# Patient Record
Sex: MALE | Race: WHITE | NOT HISPANIC OR LATINO | Employment: FULL TIME | URBAN - METROPOLITAN AREA
[De-identification: names, ages, dates, MRNs, and addresses within clinical notes are randomized per-mention and may not be internally consistent; named-entity substitution may affect disease eponyms.]

---

## 2023-07-21 ENCOUNTER — LAB (OUTPATIENT)
Dept: LAB | Facility: HOSPITAL | Age: 33
End: 2023-07-21
Payer: COMMERCIAL

## 2023-07-21 DIAGNOSIS — Z13.6 SCREENING FOR ISCHEMIC HEART DISEASE: ICD-10-CM

## 2023-07-21 DIAGNOSIS — Z01.810 PRE-OPERATIVE CARDIOVASCULAR EXAMINATION: ICD-10-CM

## 2023-07-21 PROBLEM — Q21.3 TETRALOGY OF FALLOT: Status: ACTIVE | Noted: 2023-07-21

## 2023-07-21 PROBLEM — R06.09 DYSPNEA ON EXERTION: Status: ACTIVE | Noted: 2023-07-21

## 2023-07-21 LAB
ANION GAP SERPL CALCULATED.3IONS-SCNC: 11 MMOL/L (ref 5–15)
BASOPHILS # BLD AUTO: 0.07 10*3/MM3 (ref 0–0.2)
BASOPHILS NFR BLD AUTO: 1.2 % (ref 0–1.5)
BUN SERPL-MCNC: 17 MG/DL (ref 6–20)
BUN/CREAT SERPL: 21.5 (ref 7–25)
CALCIUM SPEC-SCNC: 9.7 MG/DL (ref 8.6–10.5)
CHLORIDE SERPL-SCNC: 105 MMOL/L (ref 98–107)
CO2 SERPL-SCNC: 24 MMOL/L (ref 22–29)
CREAT SERPL-MCNC: 0.79 MG/DL (ref 0.76–1.27)
DEPRECATED RDW RBC AUTO: 38.9 FL (ref 37–54)
EGFRCR SERPLBLD CKD-EPI 2021: 121 ML/MIN/1.73
EOSINOPHIL # BLD AUTO: 0.38 10*3/MM3 (ref 0–0.4)
EOSINOPHIL NFR BLD AUTO: 6.4 % (ref 0.3–6.2)
ERYTHROCYTE [DISTWIDTH] IN BLOOD BY AUTOMATED COUNT: 12.2 % (ref 12.3–15.4)
GLUCOSE SERPL-MCNC: 91 MG/DL (ref 65–99)
HCT VFR BLD AUTO: 43.8 % (ref 37.5–51)
HGB BLD-MCNC: 14.9 G/DL (ref 13–17.7)
IMM GRANULOCYTES # BLD AUTO: 0.02 10*3/MM3 (ref 0–0.05)
IMM GRANULOCYTES NFR BLD AUTO: 0.3 % (ref 0–0.5)
LYMPHOCYTES # BLD AUTO: 1.8 10*3/MM3 (ref 0.7–3.1)
LYMPHOCYTES NFR BLD AUTO: 30.1 % (ref 19.6–45.3)
MCH RBC QN AUTO: 30 PG (ref 26.6–33)
MCHC RBC AUTO-ENTMCNC: 34 G/DL (ref 31.5–35.7)
MCV RBC AUTO: 88.3 FL (ref 79–97)
MONOCYTES # BLD AUTO: 0.49 10*3/MM3 (ref 0.1–0.9)
MONOCYTES NFR BLD AUTO: 8.2 % (ref 5–12)
NEUTROPHILS NFR BLD AUTO: 3.22 10*3/MM3 (ref 1.7–7)
NEUTROPHILS NFR BLD AUTO: 53.8 % (ref 42.7–76)
NRBC BLD AUTO-RTO: 0 /100 WBC (ref 0–0.2)
PLATELET # BLD AUTO: 246 10*3/MM3 (ref 140–450)
PMV BLD AUTO: 9.8 FL (ref 6–12)
POTASSIUM SERPL-SCNC: 4.1 MMOL/L (ref 3.5–5.2)
RBC # BLD AUTO: 4.96 10*6/MM3 (ref 4.14–5.8)
SODIUM SERPL-SCNC: 140 MMOL/L (ref 136–145)
WBC NRBC COR # BLD: 5.98 10*3/MM3 (ref 3.4–10.8)

## 2023-07-21 PROCEDURE — 85025 COMPLETE CBC W/AUTO DIFF WBC: CPT

## 2023-07-21 PROCEDURE — 36415 COLL VENOUS BLD VENIPUNCTURE: CPT

## 2023-07-21 PROCEDURE — 80048 BASIC METABOLIC PNL TOTAL CA: CPT

## 2023-07-21 NOTE — H&P (VIEW-ONLY)
"      [facility]    Subjective:     Encounter Date:07/21/23      Patient ID: Wilfrid Almanzar is a 33 y.o. male.    Chief Complaint:   Chief Complaint   Patient presents with    RS      History of Present Illness    Mr. 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  He states otherwise he has felt well without any symptoms and is previously been healthy.  He denies any other cardiac testing over the last several years.  He has not followed with a congenital specialist.    He reports that over the last 6 months, he noticed a chronic cough, the cough was a nagging, dry cough, and he had some shortness of breath with it.  He states that occasionally he would have to \"gasp to trying to get deep breaths and\" and would happen sporadically without warning.  He also has intermittent episodes of palpitations, they do not seem to have any correlation regarding to the dyspnea spells.  For the dyspnea, he was evaluated by Dr. Johnson in pulmonary, he was evaluated by pulmonary on July 10 for ongoing dyspnea symptoms and is being checked out for sleep apnea.  He did have PFTs which showed moderately reduced diffusion capacity is not corrected for hemoglobin or alveolar volume which would suggest a vascular process such as pulm hypertension.    He underwent a CT chest at [facility] which showed enlarged RV and prior median sternotomy with mildly enlarged left pulmonary artery.    I reviewed his echocardiogram.  There is mild pulmonic stenosis with a max gradient of 23 mm, mean of 10.  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    Currently, he states that with starting on montelukast, his symptoms have improved.  He is near his baseline now, he does have some very mild dyspnea when he really exerts himself " "and can feel some exertional palpitations, and occasional palpitations at rest.    Operative history:  Tetralogy of Fallot  Cardiac surgery with Dr. Corona in [facility].    On January 28, 1993 he underwent resection of the subpulmonic muscular obstruction, pulmonary commissurotomy.  There is a dacryon patch closure of VSD, pericardial patch enlargement of the main PA, Hemashield dacryon patch enlargement of the right ventricular outflow tract.  There is a bicuspid stenotic pulmonary valve, where the commissures were incised and cut off the wall opening the valve to 10 mm in diameter at the time of his operation.      The following portions of the patient's history were reviewed and updated as appropriate: allergies, current medications, past family history, past medical history, past social history, past surgical history and problem list.    History reviewed. No pertinent past medical history.    History reviewed. No pertinent surgical history.        ECG 12 Lead    Date/Time: 7/21/2023 10:51 AM  Performed by: Chepe Loaiza MD  Authorized by: Chepe Loaiza MD   Previous ECG: no previous ECG available  Rhythm: sinus rhythm  Rate: normal  Conduction: right bundle branch block  ST Depression: V2 and V3  T inversion: V2, V3 and aVL  QRS axis: right and extreme  Other findings: non-specific ST-T wave changes    Clinical impression: abnormal EKG           Objective:     Vitals:    07/21/23 1032   BP: 120/72   BP Location: Left arm   Patient Position: Sitting   Cuff Size: Adult   Pulse: 71   SpO2: 97%   Weight: 75.6 kg (166 lb 9.6 oz)   Height: 170.2 cm (67\")         Constitutional:       Appearance: Healthy appearance. Not in distress.   Neck:      Vascular: JVD normal.   Pulmonary:      Effort: Pulmonary effort is normal.      Breath sounds: Normal breath sounds.   Cardiovascular:      PMI at left midclavicular line. Normal rate. Regular rhythm. Normal S2.       Murmurs: There is a grade 2/6 harsh " midsystolic murmur at the ULSB, radiating to the neck.   Pulses:     Intact distal pulses.   Edema:     Peripheral edema absent.   Skin:     General: Skin is warm and dry.   Neurological:      General: No focal deficit present.      Mental Status: Alert, oriented to person, place, and time and oriented to person, place and time.   Psychiatric:         Mood and Affect: Mood and affect normal.       Lab Review:       No results found for: BUN, CREATININE, K, ALT, AST      Performed        Assessment:          Diagnosis Plan   1. Tetralogy of Fallot  ECG 12 Lead    Case Request Cath Lab: Right Heart Cath      2. Dyspnea on exertion  Case Request Cath Lab: Right Heart Cath      3. Palpitations  Holter Monitor - 48 Hour             Plan:         Repaired tetralogy of Fallot: I reviewed his echocardiogram, I cannot tell if the level of the flow accelerations at the level of the valve, or if it is in the RVOT.  We will further check a right heart cath.  There are concerns for pulmonary hypertension, however I would be interested to see if his pulmonary pressures are actually elevated, given the RVSP elevation, and the peak pulmonic gradient of 23, his pulmonary arterial pressures are actually normal on the echo  Mild pulmonic stenosis with moderate to severe pulmonic regurgitation  If he does have a significantly stenotic RVOT versus pulmonic valve, will likely need to further evaluate with a cardiac CT, with possible referral to congenital heart disease specialist for secondary evaluation.  However, his main complaint is dyspnea, he only has mildly elevated gradients, I am not sure that that would be causing his symptoms.  It does appear that he has moderate to severe pulmonic regurgitation.  We will arrange for right heart cath to also look for any diastolic flow reversal in his PA  Dyspnea on exertion: He reports his symptoms are actually better, along with his cough, on the Singulair.  It may very well be that he just  had a component of allergies, and his cardiac/tetralogy status is actually asymptomatic.  Checking right heart cath per above, and investigating his pulmonic regurgitation.  Agree with sleep testing and further evaluation of other causes of dyspnea.  Palpitations: Sporadically, randomly.  No syncope or loss of consciousness just occasional prominent heartbeat  Check 48-hour Holter    Thank you for allowing me to participate in the care of Wilfrid Almanzar. Feel free to contact me directly with any further questions or concerns.    RTC 3 months for symptom recheck.  Arranging for right heart cath to delineate where his pressures are elevated, evaluate the degree and severity of his pulmonic regurgitation, possible cardiac CT versus referral to [facility] congenital heart disease if there are significant abnormalities noted on his right heart cath.  Arranging for Holter per above.    Chepe Loaiza MD  [facility]  07/21/23  10:41 EDT       Current Outpatient Medications:     albuterol sulfate  (90 Base) MCG/ACT inhaler, Inhale 2 puffs Every 4 (Four) Hours As Needed for Wheezing., Disp: , Rfl:     montelukast (SINGULAIR) 10 MG tablet, Take 1 tablet by mouth Every Night., Disp: , Rfl:          Return in about 3 months (around 10/21/2023).      Part of this note may be an electronic transcription/translation of spoken language to printed text using the Dragon Dictation System.

## 2023-07-25 ENCOUNTER — HOSPITAL ENCOUNTER (OUTPATIENT)
Facility: HOSPITAL | Age: 33
Setting detail: HOSPITAL OUTPATIENT SURGERY
Discharge: HOME | End: 2023-07-25
Attending: INTERNAL MEDICINE | Admitting: INTERNAL MEDICINE
Payer: COMMERCIAL

## 2023-07-25 VITALS
SYSTOLIC BLOOD PRESSURE: 105 MMHG | DIASTOLIC BLOOD PRESSURE: 73 MMHG | HEIGHT: 67 IN | RESPIRATION RATE: 16 BRPM | HEART RATE: 59 BPM | BODY MASS INDEX: 25.98 KG/M2 | TEMPERATURE: 97.4 F | WEIGHT: 165.5 LBS | OXYGEN SATURATION: 98 %

## 2023-07-25 DIAGNOSIS — R06.09 DYSPNEA ON EXERTION: ICD-10-CM

## 2023-07-25 DIAGNOSIS — Q21.3 TETRALOGY OF FALLOT: ICD-10-CM

## 2023-07-25 PROCEDURE — C1769 GUIDE WIRE: HCPCS | Performed by: INTERNAL MEDICINE

## 2023-07-25 PROCEDURE — 25010000002 MIDAZOLAM PER 1 MG: Performed by: INTERNAL MEDICINE

## 2023-07-25 PROCEDURE — 93569 NJX CTH SLCT P-ART ANGRP UNI: CPT | Performed by: INTERNAL MEDICINE

## 2023-07-25 PROCEDURE — 25510000001 IOPAMIDOL PER 1 ML: Performed by: INTERNAL MEDICINE

## 2023-07-25 PROCEDURE — 93451 RIGHT HEART CATH: CPT | Performed by: INTERNAL MEDICINE

## 2023-07-25 PROCEDURE — 25010000002 FENTANYL CITRATE (PF) 50 MCG/ML SOLUTION: Performed by: INTERNAL MEDICINE

## 2023-07-25 PROCEDURE — C1894 INTRO/SHEATH, NON-LASER: HCPCS | Performed by: INTERNAL MEDICINE

## 2023-07-25 RX ORDER — FENTANYL CITRATE 50 UG/ML
INJECTION, SOLUTION INTRAMUSCULAR; INTRAVENOUS
Status: DISCONTINUED | OUTPATIENT
Start: 2023-07-25 | End: 2023-07-25 | Stop reason: HOSPADM

## 2023-07-25 RX ORDER — CETIRIZINE HYDROCHLORIDE 10 MG/1
10 TABLET ORAL DAILY
COMMUNITY

## 2023-07-25 RX ORDER — SODIUM CHLORIDE 0.9 % (FLUSH) 0.9 %
10 SYRINGE (ML) INJECTION AS NEEDED
Status: DISCONTINUED | OUTPATIENT
Start: 2023-07-25 | End: 2023-07-25 | Stop reason: HOSPADM

## 2023-07-25 RX ORDER — SODIUM CHLORIDE 9 MG/ML
75 INJECTION, SOLUTION INTRAVENOUS CONTINUOUS
Status: DISCONTINUED | OUTPATIENT
Start: 2023-07-25 | End: 2023-07-25 | Stop reason: HOSPADM

## 2023-07-25 RX ORDER — LIDOCAINE HYDROCHLORIDE 20 MG/ML
INJECTION, SOLUTION INFILTRATION; PERINEURAL
Status: DISCONTINUED | OUTPATIENT
Start: 2023-07-25 | End: 2023-07-25 | Stop reason: HOSPADM

## 2023-07-25 RX ORDER — MIDAZOLAM HYDROCHLORIDE 1 MG/ML
INJECTION INTRAMUSCULAR; INTRAVENOUS
Status: DISCONTINUED | OUTPATIENT
Start: 2023-07-25 | End: 2023-07-25 | Stop reason: HOSPADM

## 2023-07-25 RX ADMIN — SODIUM CHLORIDE 75 ML/HR: 9 INJECTION, SOLUTION INTRAVENOUS at 09:06

## 2023-07-25 NOTE — Clinical Note
The the pulmonary artery was injected and visualized. Rate = 10 mL/sec. Total volume = 20 mL. At 450 PSI.

## 2023-07-25 NOTE — Clinical Note
Hemostasis started on the right brachial vein. Manual pressure applied to vessel. Manual pressure was held by Modesta REILLY. Manual pressure was held for 5 min. Hemostasis achieved successfully.

## 2023-07-25 NOTE — INTERVAL H&P NOTE
H&P reviewed. The patient was examined and there are no changes to the H&P.         vital signs/nurses notes

## 2023-07-26 NOTE — PROGRESS NOTES
I reviewed findings with patient over the phone.  There is no evidence of pulmonary hypertension, normal cardiac index.  There is a peak gradient of 10 mmHg across his pulmonic valve, and there appears to be at least moderate pulmonic regurgitation.  Given his dilated RV, pulmonary regurgitation in the setting of repaired tetralogy of Fallot and his most recent operation being at the age of 3, will refer to adult congenital cardiology for further evaluation.  I went over findings with patient he is amenable to further evaluation.  I discussed options of referral with adult congenital cardiology [facility] versus [facility], and he is amenable to proceeding with evaluation at .

## 2023-10-19 ENCOUNTER — TRANSCRIBE ORDERS (OUTPATIENT)
Dept: CARDIAC REHAB | Facility: HOSPITAL | Age: 33
End: 2023-10-19
Payer: COMMERCIAL

## 2023-10-19 DIAGNOSIS — Z95.4 S/P TRANSCATHETER REPLACEMENT OF PULMONARY VALVE: Primary | ICD-10-CM

## 2023-10-25 ENCOUNTER — OFFICE VISIT (OUTPATIENT)
Dept: CARDIAC REHAB | Facility: HOSPITAL | Age: 33
End: 2023-10-25
Payer: COMMERCIAL

## 2023-10-25 DIAGNOSIS — Z95.4 S/P TRANSCATHETER REPLACEMENT OF PULMONARY VALVE: Primary | ICD-10-CM

## 2023-10-25 PROCEDURE — 93798 PHYS/QHP OP CAR RHAB W/ECG: CPT

## 2023-10-27 ENCOUNTER — OFFICE VISIT (OUTPATIENT)
Dept: CARDIOLOGY | Facility: CLINIC | Age: 33
End: 2023-10-27
Payer: COMMERCIAL

## 2023-10-27 VITALS
BODY MASS INDEX: 27.09 KG/M2 | OXYGEN SATURATION: 97 % | SYSTOLIC BLOOD PRESSURE: 110 MMHG | HEIGHT: 67 IN | HEART RATE: 90 BPM | WEIGHT: 172.6 LBS | DIASTOLIC BLOOD PRESSURE: 72 MMHG

## 2023-10-27 DIAGNOSIS — R06.09 DYSPNEA ON EXERTION: ICD-10-CM

## 2023-10-27 DIAGNOSIS — Q21.3 TETRALOGY OF FALLOT: Primary | ICD-10-CM

## 2023-10-27 DIAGNOSIS — Z95.2 H/O PULMONIC VALVE REPLACEMENT: ICD-10-CM

## 2023-10-27 PROCEDURE — 99214 OFFICE O/P EST MOD 30 MIN: CPT | Performed by: STUDENT IN AN ORGANIZED HEALTH CARE EDUCATION/TRAINING PROGRAM

## 2023-10-27 NOTE — PROGRESS NOTES
[facility]    Subjective:     Encounter Date:10/27/23      Patient ID: Wilfrid Almanzar is a 33 y.o. male.    Chief Complaint:   Chief Complaint   Patient presents with    Tetralogy of Fallot   Follow-up post [place] valve  History of Present Illness    Mr. 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  He states otherwise he has felt well without any symptoms and is previously been healthy.  He denies any other cardiac testing over the last several years.  He has not followed with a congenital specialist.    He had worsening exertional symptoms and his CT chest obtained by his pulmonologist showed RV enlargement.  He subsequent underwent an echocardiogram which revealed moderate pulmonary stenosis and probably severe PI.  His RV was dilated.  With his dyspnea symptoms in the setting of pulmonary insufficiency, he was referred to congenital heart disease at [facility].  He subsequent underwent a [place] valve placement and tolerated the procedure well, per below.    He presents today for follow-up.  He reports compliance with his Eliquis and aspirin monotherapy.  He has not had any bleeding complications.  His breathing is much improved and he is glad he underwent the treatment.  He continues to follow with a dentist and will take dental prophylaxis before any procedures.  He has scheduled follow-up with [facility] next month    Operative history:  Tetralogy of Fallot  Cardiac surgery with Dr. Corona in [facility].    On January 28, 1993 he underwent resection of the subpulmonic muscular obstruction, pulmonary commissurotomy.  There is a dacryon patch closure of VSD, pericardial patch enlargement of the main PA, Hemashield dacryon patch enlargement of the right ventricular outflow tract.  There is a bicuspid stenotic pulmonary  "valve, where the commissures were incised and cut off the wall opening the valve to 10 mm in diameter at the time of his operation.    He underwent successful implantation of a [place] T PV 25 on October 10, 2023 with Dr. Andrew Leventhal at [facility].  - Successful decrease of gradients to proximal and 50 mmHg across the valve.  Aortic regurgitation has resolved.  - The procedure was technically difficult there were multiple valves that were placed and replaced, subsequently and there is a flail tricuspid leaflet with a increase in tricuspid regurgitation    The following portions of the patient's history were reviewed and updated as appropriate: allergies, current medications, past family history, past medical history, past social history, past surgical history and problem list.    Past Medical History:   Diagnosis Date    Tetralogy of Fallot        Past Surgical History:   Procedure Laterality Date    CARDIAC CATHETERIZATION N/A 7/25/2023    Procedure: Right Heart Cath;  Surgeon: Divya Lux MD;  Location:  [facility] CATH INVASIVE LOCATION;  Service: Cardiovascular;  Laterality: N/A;  pulmonary stenosis v RVOT obstruction in repaired TOF, eval for concurrent pHTN    INTERVENTIONAL RADIOLOGY PROCEDURE Bilateral 7/25/2023    Procedure: Pulmonary Angiogram;  Surgeon: Divya Lux MD;  Location:  [facility] CATH INVASIVE LOCATION;  Service: Cardiovascular;  Laterality: Bilateral;    TETRALOGY OF FALLOT REPAIR      2 y/o         Procedures       Objective:     Vitals:    10/27/23 1042   BP: 110/72   Pulse: 90   SpO2: 97%   Weight: 78.3 kg (172 lb 9.6 oz)   Height: 170.2 cm (67\")           Constitutional:       Appearance: Healthy appearance. Not in distress.   Neck:      Vascular: JVD normal.   Pulmonary:      Effort: Pulmonary effort is normal.      Breath sounds: Normal breath sounds.   Cardiovascular:      PMI at left midclavicular line. Normal rate. Regular rhythm. Normal S2.       Murmurs: There is a " grade 2/6 harsh midsystolic murmur at the ULSB, radiating to the neck.   Pulses:     Intact distal pulses.   Edema:     Peripheral edema absent.   Skin:     General: Skin is warm and dry.   Neurological:      General: No focal deficit present.      Mental Status: Alert, oriented to person, place, and time and oriented to person, place and time.   Psychiatric:         Mood and Affect: Mood and affect normal.         Lab Review:       BUN   Date Value Ref Range Status   07/21/2023 17 6 - 20 mg/dL Final     Creatinine   Date Value Ref Range Status   07/21/2023 0.79 0.76 - 1.27 mg/dL Final     Potassium   Date Value Ref Range Status   07/21/2023 4.1 3.5 - 5.2 mmol/L Final         Performed        Assessment:          Diagnosis Plan   1. Tetralogy of Fallot        2. H/O pulmonic valve replacement        3. Dyspnea on exertion                 Plan:         Repaired tetralogy of Fallot: Complicated by severe pulmonic regurgitation and pulmonic stenosis.  He had dyspnea symptoms.    Cardiac CT performed at [facility] showed severely enlarged RV and mildly enlarged left pulmonary artery.  Given dyspnea and severe RV enlargement, he underwent transcatheter pulmonic valve replacement  He was referred to [facility] structural cardiology team, and successfully underwent [place] valve placement with [place] T PV 25 October 10, 2023.    Partially flail tricuspid leaflet with moderate TR.  Thought to be well-tolerated given the fact that his severe CT has now resolved and there is not any significant pulmonic gradients across his valve.  Continue follow-up with .  They will perform his post echoes.    He is doing quite well and he states that his symptoms are improved.     He is to continue on aspirin 81 and indefinitely, and on Eliquis 5 p.o. twice daily per structural cardiology at  post pulmonic valve placement.  Not requiring beta-blockers.   He will need antibiotic prophylaxis before any dental  procedures.  Tolerating cardiac rehab quite well.  Dyspnea on exertion: Improving    Palpitations: Sporadically, randomly.  No syncope or loss of consciousness just occasional prominent heartbeat.  Improved after valve replacement    Return 1 year for annual check-in.  He is to follow-up with  as previously scheduled.    Chepe Loaiza MD  [facility]  10/27/23  10:41 EDT       Current Outpatient Medications:     apixaban (ELIQUIS) 5 MG tablet tablet, Take 1 tablet by mouth 2 (Two) Times a Day., Disp: , Rfl:     cetirizine (zyrTEC) 10 MG tablet, Take 1 tablet by mouth Daily., Disp: , Rfl:     montelukast (SINGULAIR) 10 MG tablet, Take 1 tablet by mouth Every Night., Disp: , Rfl:     albuterol sulfate  (90 Base) MCG/ACT inhaler, Inhale 2 puffs Every 4 (Four) Hours As Needed for Wheezing. (Patient not taking: Reported on 10/27/2023), Disp: , Rfl:          Return in about 1 year (around 10/27/2024).      Part of this note may be an electronic transcription/translation of spoken language to printed text using the Dragon Dictation System.

## 2023-10-27 NOTE — PATIENT INSTRUCTIONS
Make sure you reach out to the [facility] team for any dental antibiotics before cleanings. Oral health is very important with your prosthetic valve to prevent valve infections.

## 2023-10-30 ENCOUNTER — TREATMENT (OUTPATIENT)
Dept: CARDIAC REHAB | Facility: HOSPITAL | Age: 33
End: 2023-10-30
Payer: COMMERCIAL

## 2023-10-30 DIAGNOSIS — Z95.4 S/P TRANSCATHETER REPLACEMENT OF PULMONARY VALVE: Primary | ICD-10-CM

## 2023-10-30 PROCEDURE — 93798 PHYS/QHP OP CAR RHAB W/ECG: CPT

## 2023-11-01 ENCOUNTER — TREATMENT (OUTPATIENT)
Dept: CARDIAC REHAB | Facility: HOSPITAL | Age: 33
End: 2023-11-01
Payer: COMMERCIAL

## 2023-11-01 DIAGNOSIS — Z95.4 S/P TRANSCATHETER REPLACEMENT OF PULMONARY VALVE: Primary | ICD-10-CM

## 2023-11-01 PROCEDURE — 93798 PHYS/QHP OP CAR RHAB W/ECG: CPT

## 2023-11-06 ENCOUNTER — TREATMENT (OUTPATIENT)
Dept: CARDIAC REHAB | Facility: HOSPITAL | Age: 33
End: 2023-11-06
Payer: COMMERCIAL

## 2023-11-06 DIAGNOSIS — Z95.4 S/P TRANSCATHETER REPLACEMENT OF PULMONARY VALVE: Primary | ICD-10-CM

## 2023-11-06 PROCEDURE — 93798 PHYS/QHP OP CAR RHAB W/ECG: CPT

## 2023-11-08 ENCOUNTER — TREATMENT (OUTPATIENT)
Dept: CARDIAC REHAB | Facility: HOSPITAL | Age: 33
End: 2023-11-08
Payer: COMMERCIAL

## 2023-11-08 DIAGNOSIS — Z95.4 S/P TRANSCATHETER REPLACEMENT OF PULMONARY VALVE: Primary | ICD-10-CM

## 2023-11-08 PROCEDURE — 93798 PHYS/QHP OP CAR RHAB W/ECG: CPT

## 2023-11-13 ENCOUNTER — TREATMENT (OUTPATIENT)
Dept: CARDIAC REHAB | Facility: HOSPITAL | Age: 33
End: 2023-11-13
Payer: COMMERCIAL

## 2023-11-13 DIAGNOSIS — Z95.4 S/P TRANSCATHETER REPLACEMENT OF PULMONARY VALVE: Primary | ICD-10-CM

## 2023-11-13 PROCEDURE — 93798 PHYS/QHP OP CAR RHAB W/ECG: CPT

## 2023-11-15 ENCOUNTER — TREATMENT (OUTPATIENT)
Dept: CARDIAC REHAB | Facility: HOSPITAL | Age: 33
End: 2023-11-15
Payer: COMMERCIAL

## 2023-11-15 DIAGNOSIS — Z95.4 S/P TRANSCATHETER REPLACEMENT OF PULMONARY VALVE: Primary | ICD-10-CM

## 2023-11-15 PROCEDURE — 93798 PHYS/QHP OP CAR RHAB W/ECG: CPT

## 2023-11-20 ENCOUNTER — APPOINTMENT (OUTPATIENT)
Dept: CARDIAC REHAB | Facility: HOSPITAL | Age: 33
End: 2023-11-20
Payer: COMMERCIAL

## 2023-11-22 ENCOUNTER — TREATMENT (OUTPATIENT)
Dept: CARDIAC REHAB | Facility: HOSPITAL | Age: 33
End: 2023-11-22
Payer: COMMERCIAL

## 2023-11-22 DIAGNOSIS — Z95.4 S/P TRANSCATHETER REPLACEMENT OF PULMONARY VALVE: Primary | ICD-10-CM

## 2023-11-22 PROCEDURE — 93797 PHYS/QHP OP CAR RHAB WO ECG: CPT

## 2023-11-22 PROCEDURE — 93798 PHYS/QHP OP CAR RHAB W/ECG: CPT

## 2023-11-27 ENCOUNTER — TREATMENT (OUTPATIENT)
Dept: CARDIAC REHAB | Facility: HOSPITAL | Age: 33
End: 2023-11-27
Payer: COMMERCIAL

## 2023-11-27 DIAGNOSIS — Z95.4 S/P TRANSCATHETER REPLACEMENT OF PULMONARY VALVE: Primary | ICD-10-CM

## 2023-11-27 PROCEDURE — 93798 PHYS/QHP OP CAR RHAB W/ECG: CPT

## 2023-11-29 ENCOUNTER — TREATMENT (OUTPATIENT)
Dept: CARDIAC REHAB | Facility: HOSPITAL | Age: 33
End: 2023-11-29
Payer: COMMERCIAL

## 2023-11-29 DIAGNOSIS — Z95.4 S/P TRANSCATHETER REPLACEMENT OF PULMONARY VALVE: Primary | ICD-10-CM

## 2023-11-29 PROCEDURE — 93798 PHYS/QHP OP CAR RHAB W/ECG: CPT

## 2023-12-04 ENCOUNTER — TREATMENT (OUTPATIENT)
Dept: CARDIAC REHAB | Facility: HOSPITAL | Age: 33
End: 2023-12-04
Payer: COMMERCIAL

## 2023-12-04 DIAGNOSIS — Z95.4 S/P TRANSCATHETER REPLACEMENT OF PULMONARY VALVE: Primary | ICD-10-CM

## 2023-12-04 PROCEDURE — 93798 PHYS/QHP OP CAR RHAB W/ECG: CPT

## 2023-12-06 ENCOUNTER — TREATMENT (OUTPATIENT)
Dept: CARDIAC REHAB | Facility: HOSPITAL | Age: 33
End: 2023-12-06
Payer: COMMERCIAL

## 2023-12-06 DIAGNOSIS — Z95.4 S/P TRANSCATHETER REPLACEMENT OF PULMONARY VALVE: Primary | ICD-10-CM

## 2023-12-06 PROCEDURE — 93798 PHYS/QHP OP CAR RHAB W/ECG: CPT

## 2023-12-11 ENCOUNTER — TREATMENT (OUTPATIENT)
Dept: CARDIAC REHAB | Facility: HOSPITAL | Age: 33
End: 2023-12-11
Payer: COMMERCIAL

## 2023-12-11 DIAGNOSIS — Z95.4 S/P TRANSCATHETER REPLACEMENT OF PULMONARY VALVE: Primary | ICD-10-CM

## 2023-12-11 PROCEDURE — 93798 PHYS/QHP OP CAR RHAB W/ECG: CPT

## 2023-12-13 ENCOUNTER — TREATMENT (OUTPATIENT)
Dept: CARDIAC REHAB | Facility: HOSPITAL | Age: 33
End: 2023-12-13
Payer: COMMERCIAL

## 2023-12-13 DIAGNOSIS — Z95.4 S/P TRANSCATHETER REPLACEMENT OF PULMONARY VALVE: Primary | ICD-10-CM

## 2023-12-13 PROCEDURE — 93798 PHYS/QHP OP CAR RHAB W/ECG: CPT

## 2023-12-18 ENCOUNTER — TREATMENT (OUTPATIENT)
Dept: CARDIAC REHAB | Facility: HOSPITAL | Age: 33
End: 2023-12-18
Payer: COMMERCIAL

## 2023-12-18 DIAGNOSIS — Z95.4 S/P TRANSCATHETER REPLACEMENT OF PULMONARY VALVE: Primary | ICD-10-CM

## 2023-12-18 PROCEDURE — 93798 PHYS/QHP OP CAR RHAB W/ECG: CPT

## 2023-12-20 ENCOUNTER — TREATMENT (OUTPATIENT)
Dept: CARDIAC REHAB | Facility: HOSPITAL | Age: 33
End: 2023-12-20
Payer: COMMERCIAL

## 2023-12-20 DIAGNOSIS — Z95.4 S/P TRANSCATHETER REPLACEMENT OF PULMONARY VALVE: Primary | ICD-10-CM

## 2023-12-20 PROCEDURE — 93798 PHYS/QHP OP CAR RHAB W/ECG: CPT

## 2023-12-27 ENCOUNTER — APPOINTMENT (OUTPATIENT)
Dept: CARDIAC REHAB | Facility: HOSPITAL | Age: 33
End: 2023-12-27
Payer: COMMERCIAL

## 2024-11-07 ENCOUNTER — OFFICE VISIT (OUTPATIENT)
Dept: CARDIOLOGY | Facility: CLINIC | Age: 34
End: 2024-11-07
Payer: COMMERCIAL

## 2024-11-07 VITALS
BODY MASS INDEX: 28.41 KG/M2 | HEART RATE: 63 BPM | HEIGHT: 67 IN | SYSTOLIC BLOOD PRESSURE: 124 MMHG | DIASTOLIC BLOOD PRESSURE: 70 MMHG | WEIGHT: 181 LBS

## 2024-11-07 DIAGNOSIS — Z95.2 H/O PULMONIC VALVE REPLACEMENT: ICD-10-CM

## 2024-11-07 DIAGNOSIS — Q21.3 TETRALOGY OF FALLOT: Primary | ICD-10-CM

## 2024-11-07 PROCEDURE — 99214 OFFICE O/P EST MOD 30 MIN: CPT | Performed by: NURSE PRACTITIONER

## 2024-11-07 PROCEDURE — 93000 ELECTROCARDIOGRAM COMPLETE: CPT | Performed by: NURSE PRACTITIONER

## 2024-11-07 RX ORDER — ASPIRIN 325 MG
325 TABLET, DELAYED RELEASE (ENTERIC COATED) ORAL EVERY 6 HOURS PRN
COMMUNITY

## 2024-11-07 NOTE — PROGRESS NOTES
Subjective:     Encounter Date:11/07/2024      Patient ID: Wilfrid Almanzar is a 34 y.o. male.    Chief Complaint:follow up Tetralogy of Fallot  History of Present Illness  This is a 34-year-old man who follows with Dr. Loaiza and is new to me today.  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  He saw Dr. Loaiza for the first time in July 2023 for evaluation of a persisting cough and shortness of breath for 6 months.  He is evaluated by Dr. Deras and PFT showed moderately reduced diffusion capacity.  He felt this was possibly due to pulmonary hypertension.  He underwent a CT of the chest that showed enlarged RV and prior median sternotomy with mildly enlarged left pulmonary artery.  An echo was obtained that showed mild pulmonic stenosis with a max gradient of 23 mmHg and a mean of 10.  At the time of his visit with Dr. Loaiza he reported that he had been started on montelukast and his symptoms had improved.  Dr. Loaiza recommended proceeding with a right heart cath.  This was performed on 7/25/2023 and showed normal right sided filling pressures and moderate PI.  It was recommended that he see adult congenital cardiology and he was referred to  for transcatheter pulmonary valve implantation.    He saw Dr. Lyons in follow-up in October 2023 and was doing well.  He was continued on his current medications.  He then followed up with the structural heart team at  and he has been taken off of Eliquis.    He is here today for follow-up visit.  He has been having coughing fits again for about 6 months.  He is seeing his PCP about this.  Chest x-ray was normal and blood work is pending.  He says all the major symptoms that he had prior to undergoing surgery at  have gone away.  He is still getting yearly echocardiograms at .  He denies any chest pain, shortness of breath, palpitations, dizziness or syncope.    I have reviewed and updated as appropriate allergies, current  medications, past family history, past medical history, past surgical history and problem list.    Review of Systems   Constitutional: Negative for fever, malaise/fatigue, weight gain and weight loss.   HENT:  Negative for congestion, hoarse voice and sore throat.    Eyes:  Negative for blurred vision and double vision.   Cardiovascular:  Negative for chest pain, dyspnea on exertion, leg swelling, orthopnea, palpitations and syncope.   Respiratory:  Positive for cough. Negative for shortness of breath and wheezing.    Gastrointestinal:  Negative for abdominal pain, hematemesis, hematochezia and melena.   Genitourinary:  Negative for dysuria and hematuria.   Neurological:  Negative for dizziness, headaches, light-headedness and numbness.   Psychiatric/Behavioral:  Negative for depression. The patient is not nervous/anxious.          Current Outpatient Medications:     aspirin 325 MG EC tablet, Take 1 tablet by mouth Every 6 (Six) Hours As Needed for Mild Pain., Disp: , Rfl:     cetirizine (zyrTEC) 10 MG tablet, Take 1 tablet by mouth Daily., Disp: , Rfl:     montelukast (SINGULAIR) 10 MG tablet, Take 1 tablet by mouth Every Night., Disp: , Rfl:     albuterol sulfate  (90 Base) MCG/ACT inhaler, Inhale 2 puffs Every 4 (Four) Hours As Needed for Wheezing. (Patient not taking: Reported on 11/7/2024), Disp: , Rfl:     Past Medical History:   Diagnosis Date    Tetralogy of Fallot        Past Surgical History:   Procedure Laterality Date    CARDIAC CATHETERIZATION N/A 7/25/2023    Procedure: Right Heart Cath;  Surgeon: Divya Lux MD;  Location: [facility] INVASIVE LOCATION;  Service: Cardiovascular;  Laterality: N/A;  pulmonary stenosis v RVOT obstruction in repaired TOF, eval for concurrent pHTN    INTERVENTIONAL RADIOLOGY PROCEDURE Bilateral 7/25/2023    Procedure: Pulmonary Angiogram;  Surgeon: Divya Lux MD;  Location: [facility] INVASIVE LOCATION;  Service: Cardiovascular;  Laterality: Bilateral;     "TETRALOGY OF FALLOT REPAIR      4 y/o       Family History   Problem Relation Age of Onset    Heart attack Paternal Grandfather        Social History     Tobacco Use    Smoking status: Former     Current packs/day: 0.00     Average packs/day: 0.3 packs/day for 1 year (0.2 ttl pk-yrs)     Types: Cigarettes, Electronic Cigarette     Start date: 2022     Quit date: 2022     Years since quittin.9    Smokeless tobacco: Never    Tobacco comments:     Occasionally sometimes after drinks or when i got stressed at work   Vaping Use    Vaping status: Never Used   Substance Use Topics    Alcohol use: Yes     Comment: socially    Drug use: Never         ECG 12 Lead    Date/Time: 2024 3:53 PM  Performed by: Annia Patel APRN    Authorized by: Annia Patel APRN  Comparison: compared with previous ECG from 2023  Similar to previous ECG  Rhythm: sinus rhythm  Conduction: non-specific intraventricular conduction delay             Objective:     Visit Vitals  /70 (BP Location: Right arm, Patient Position: Sitting, Cuff Size: Adult)   Pulse 63   Ht 170.2 cm (67\")   Wt 82.1 kg (181 lb)   BMI 28.35 kg/m²             Physical Exam  Constitutional:       Appearance: Normal appearance. He is normal weight.   HENT:      Head: Normocephalic.   Neck:      Vascular: No carotid bruit.   Cardiovascular:      Rate and Rhythm: Normal rate and regular rhythm.      Chest Wall: PMI is not displaced.      Pulses: Normal pulses.           Radial pulses are 2+ on the right side and 2+ on the left side.        Posterior tibial pulses are 2+ on the right side and 2+ on the left side.      Heart sounds: Murmur heard.      Systolic murmur is present with a grade of 2/6.      No friction rub. No gallop.   Pulmonary:      Effort: Pulmonary effort is normal.      Breath sounds: Normal breath sounds.   Abdominal:      General: Bowel sounds are normal. There is no distension.      Palpations: Abdomen is soft. "   Musculoskeletal:      Right lower leg: No edema.      Left lower leg: No edema.   Skin:     General: Skin is warm and dry.      Capillary Refill: Capillary refill takes less than 2 seconds.   Neurological:      Mental Status: He is alert and oriented to person, place, and time.   Psychiatric:         Mood and Affect: Mood normal.         Behavior: Behavior normal.         Thought Content: Thought content normal.          Lab Review:   Lipid Panel          10/22/2024    13:13   Lipid Panel   Total Cholesterol 205       Triglycerides 98       HDL Cholesterol 43       LDL Cholesterol  141          Details          This result is from an external source.                 Cardiac Procedures:       Assessment:         Diagnoses and all orders for this visit:    1. Tetralogy of Fallot (Primary)    2. H/O pulmonic valve replacement    Other orders  -     ECG 12 Lead            Plan:       Repaired tetralogy of Fallot: complicated by severe pulmonic regurgitation and pulmonic stenosis. Cardiac CT showed severely enlarged RV and mildly enlarged left pulmonary artery. S/P transcatheter pulmonic valve with [place] T PV 25 October 10, 2023. He continues to to follow with  with yearly echocardiograms. He is on aspirin only now and Eliquis has been stopped by CV surgery at . He will need antibiotic prophylaxis before any dental procedure. Otherwise, continue with current cardiac management.  Cough: persistent. PCP is managing  Palpitations: stable.    Thank you for allowing me to participate in this patient's care. Please call with any questions or concerns. Mr. Almanzar will follow up with Dr. Loaiza in 1 year.          Your medication list            Accurate as of November 7, 2024  3:55 PM. If you have any questions, ask your nurse or doctor.                CONTINUE taking these medications        Instructions Last Dose Given Next Dose Due   albuterol sulfate  (90 Base) MCG/ACT inhaler  Commonly known as: PROVENTIL  HFA;VENTOLIN HFA;PROAIR HFA      Inhale 2 puffs Every 4 (Four) Hours As Needed for Wheezing.       aspirin 325 MG EC tablet      Take 1 tablet by mouth Every 6 (Six) Hours As Needed for Mild Pain.       cetirizine 10 MG tablet  Commonly known as: zyrTEC      Take 1 tablet by mouth Daily.       montelukast 10 MG tablet  Commonly known as: SINGULAIR      Take 1 tablet by mouth Every Night.                  ROBERTO Austin  11/07/24  3:13 PM EST

## (undated) DEVICE — GW EMR FIX EXCHG J STD .035 3MM 260CM

## (undated) DEVICE — INTRO SHEATH ART/FEM ENGAGE .035 7F12CM

## (undated) DEVICE — GW INQWIRE FC PTFE J/3MM .021 180

## (undated) DEVICE — BALN PRESS WEDGE 5F 110CM

## (undated) DEVICE — PK CATH CARD 40

## (undated) DEVICE — KT MANIFLD CARDIAC

## (undated) DEVICE — CATH VENT MIV RADL PIG ST TIP 5F 110CM

## (undated) DEVICE — GLIDESHEATH BASIC HYDROPHILIC COATED INTRODUCER SHEATH: Brand: GLIDESHEATH

## (undated) DEVICE — CATH PULM WEDGE PRESS 7F

## (undated) DEVICE — CATH DIAG CARD PERFORM JR4.0 BT 4F100CM

## (undated) DEVICE — HI-TORQUE BALANCE MIDDLEWEIGHT GUIDE WIRE .014 STRAIGHT TIP 3.0 CM X 190 CM: Brand: HI-TORQUE BALANCE MIDDLEWEIGHT